# Patient Record
(demographics unavailable — no encounter records)

---

## 2024-12-23 NOTE — PHYSICAL EXAM
[FreeTextEntry1] : MS: Alert & oriented to person, place time, good fund of knowledge. Follows commands CN: VFF to confrontation, EOMI full without nystagmus, PERRL, V1-V3 intact to light touch, face symmetrical, hears finger rub bilaterally, palate elevates symmetrically, shoulders elevate symmetrically, tongue midline MOTOR: In wheelchair, normal tone x 4 extremities, Power 5/5 proximally and distally bilaterally, no drift, normal rapid alternating movements. SENSORY: Intact LT x 4 extremities REFLEXES: Biceps 2+ bilaterally, triceps 2+ bilaterally, brachioradialis 2+ bilaterally, patella 2+ bilaterally, ankle 2+ bilaterally, plantar flexor bilaterally COORD: Normal FNF, no tremor or dysmetria GAIT: Deferred

## 2024-12-23 NOTE — HISTORY OF PRESENT ILLNESS
[FreeTextEntry1] : ***12/23/2024*** Interval History: Had a mechanical fall in August 2024 and had a femoral fracture, was hospitalized and had surgery, has recovered from this at this time. She is doing well otherwise.   She has been on Gabapentin 600 mg TID, she takes it BID currently. She has been on this for 5 years, daughter who is accompanying the patient states she has not noticed any adverse effects. She has not had any seizures or seizure like events.  She also takes Clonazepam 0.25 mg qhs for sleep. She is also scheduled for an outpatient appt with a sleep specialist.  For her fibromyalgia and pain in her lower back, she takes Cyclobenzaprine 10 mg.  She is in a wheelchair during the appointment, daughter who is accompanying her states that she is able to ambulate but if she needs to go up stairs. She had PT in the past after her hospitalization, has been having PT come to the house.  ***UPDATE:8/29/2023*** Appointment was conducted by audiovisual/telehealth at request of patient in place of a follow up office visit due to heightened concern for Corona virus infection risk. Verbal consent given on Aug 29, 2023 03:26 PM by the patient Ms. LAST SHARPE May 23 1953 who understands that the telephone visit will be charged to insurance and may involve co-pay for patient Nurse Practitioner location:office Patient location:home Individuals on call: LILIA Frances, Ms. LAST SHARPE/daughter-Cherry Sharpe is doing well with no reported interval seizures Her daughter states that the Clonazepam 0.25 mg is working better for her to help sleep then the 0.5 mg tabs as the higher dose causes fatigue during the day  ***UPDATE:8/9/2023*** Appointment was conducted by audiovisual/telehealth at request of patient in place of a follow up office visit due to heightened concern for Corona virus infection risk. Verbal consent given on Aug 10, 2023 01:54 AM by the patient Ms. LAST SHARPE May 23 1953 who understands that the telephone visit will be charged to insurance and may involve co-pay for patient Nurse Practitioner location:office Patient location:home Individuals on call: LILIA Frances, Ms. LAST SHARPE/daughter Cherry Sharpe is doing well without seizures and the Savella is helping her Fibromyalgia.'Daughter states she has been having trouble sleeping and her energy levels are low She has tried Melatonin and CBD oil and also does deep breathing exercises.   ***UPDATE:5/10/2023*** Appointment was conducted by audiovisual/telehealth at request of patient in place of a follow up office visit due to heightened concern for Corona virus infection risk. Verbal consent given on May 10, 2023 1:24 PM by the patient Ms. LAST SHARPE May 23 1953 who understands that the telephone visit will be charged to insurance and may involve co-pay for patient Nurse Practitioner location:office Patient location:home Individuals on call: LILIA Frances, Ms. LAST SHARPE/daughter Cherry Sharpe is doing well with no reported interval seizures Fibromyalgia much better on Savella  Gabapentin 600mg TID Savella 50 mg twice daily   *** 02/01/2023 ***  LAST SHARPE is here for follow up. her history is as below:  Hospital Course  70 YO Female with a PMHx significant for lupus, atrial fibrillation, seizures, CVA (11/18/22 with R M1 occlusion, L sided paresis s/p TPA and thrombectomy), arthritis, HTN, HLD, fibromyalgia, spinal canal stenosis, has presented to the ED due to concern for witnessed seizure like activity at 9 AM on 12/4/22 for which neurology consulted. Per daughter, who has cameras at home, pt went to the bathroom around that time and when she came back, pt seemed somewhat confused, eyes rolled back with RUE shaking with upper body rotated to left, urinary incontinence with associated post ictal confusion but no tongue bite. Pt was not answering questions properly with incomplete sentences and had trouble remember some things like names and repeatedly said "yes". At baseline pt is AAOx3 and needs with help with activities at home. Patient admitted to EMU and monitored on EEG. Gabapentin is increased to 600mg BID and Follow up with Dr. Huber outpatient.

## 2024-12-23 NOTE — ASSESSMENT
[FreeTextEntry1] : LAST PATEL is a 71 year old with history of suspected provoked seizure. Her gabapentin was increased in the past, she currently takes Gabapentin 600 mg twice daily and she is stable on this regimen. She states her diffuse pain (fibromyalgia and spinal stenosis) has been much better controlled on Savella.  Plan: -Continue Gabapentin 600 BID/Savella 50 mg BID -Continue Clonazepam 0.25 mg #30 qhs to help with sleep -F/u in 6-9 months with Dr Steve, please call if any breakthrough seizures.

## 2025-04-07 NOTE — HISTORY OF PRESENT ILLNESS
[FreeTextEntry1] : ***12/23/2024*** Interval History: Had a mechanical fall in August 2024 and had a femoral fracture, was hospitalized and had surgery, has recovered from this at this time. She is doing well otherwise.   She has been on Gabapentin 600 mg TID, she takes it BID currently. She has been on this for 5 years, daughter who is accompanying the patient states she has not noticed any adverse effects. She has not had any seizures or seizure like events.  She also takes Clonazepam 0.25 mg qhs for sleep. She is also scheduled for an outpatient appt with a sleep specialist.  For her fibromyalgia and pain in her lower back, she takes Cyclobenzaprine 10 mg.  She is in a wheelchair during the appointment, daughter who is accompanying her states that she is able to ambulate but if she needs to go up stairs. She had PT in the past after her hospitalization, has been having PT come to the house.  ***UPDATE:8/29/2023*** Appointment was conducted by audiovisual/telehealth at request of patient in place of a follow up office visit due to heightened concern for Corona virus infection risk. Verbal consent given on Aug 29, 2023 03:26 PM by the patient Ms. LAST SHARPE May 23 1953 who understands that the telephone visit will be charged to insurance and may involve co-pay for patient Nurse Practitioner location:office Patient location:home Individuals on call: LILIA Frances, Ms. LAST SHARPE/daughter-Cherry Sharpe is doing well with no reported interval seizures Her daughter states that the Clonazepam 0.25 mg is working better for her to help sleep then the 0.5 mg tabs as the higher dose causes fatigue during the day  ***UPDATE:8/9/2023*** Appointment was conducted by audiovisual/telehealth at request of patient in place of a follow up office visit due to heightened concern for Corona virus infection risk. Verbal consent given on Aug 10, 2023 01:54 AM by the patient Ms. LAST SHARPE May 23 1953 who understands that the telephone visit will be charged to insurance and may involve co-pay for patient Nurse Practitioner location:office Patient location:home Individuals on call: LILIA Frances, Ms. LAST SHARPE/daughter Cherry Sharpe is doing well without seizures and the Savella is helping her Fibromyalgia.'Daughter states she has been having trouble sleeping and her energy levels are low She has tried Melatonin and CBD oil and also does deep breathing exercises.   ***UPDATE:5/10/2023*** Appointment was conducted by audiovisual/telehealth at request of patient in place of a follow up office visit due to heightened concern for Corona virus infection risk. Verbal consent given on May 10, 2023 1:24 PM by the patient Ms. LAST SHARPE May 23 1953 who understands that the telephone visit will be charged to insurance and may involve co-pay for patient Nurse Practitioner location:office Patient location:home Individuals on call: LILIA Frances, Ms. LAST SHARPE/daughter Cherry Sharpe is doing well with no reported interval seizures Fibromyalgia much better on Savella  Gabapentin 600mg TID Savella 50 mg twice daily   *** 02/01/2023 ***  LAST SHARPE is here for follow up. her history is as below:  Hospital Course  68 YO Female with a PMHx significant for lupus, atrial fibrillation, seizures, CVA (11/18/22 with R M1 occlusion, L sided paresis s/p TPA and thrombectomy), arthritis, HTN, HLD, fibromyalgia, spinal canal stenosis, has presented to the ED due to concern for witnessed seizure like activity at 9 AM on 12/4/22 for which neurology consulted. Per daughter, who has cameras at home, pt went to the bathroom around that time and when she came back, pt seemed somewhat confused, eyes rolled back with RUE shaking with upper body rotated to left, urinary incontinence with associated post ictal confusion but no tongue bite. Pt was not answering questions properly with incomplete sentences and had trouble remember some things like names and repeatedly said "yes". At baseline pt is AAOx3 and needs with help with activities at home. Patient admitted to EMU and monitored on EEG. Gabapentin is increased to 600mg BID and Follow up with Dr. Huber outpatient.

## 2025-07-08 NOTE — HISTORY OF PRESENT ILLNESS
[FreeTextEntry1] : LAST PATEL is a 72  year RH female with a Pmhx of Pmhx HTN, HLD, afib now s/p Watchman, CVA 11/18/2022 R M1 occlusion s/p tPA and thrombectomy, Lupus, fibromyalgia, diverticulosis s/p sigmoid colonic rupture s/p colostomy and anastomosis 2017, Seizures, chronic lower back pain onset 2012 following work related injury- [employer] transferring patient who presents today for follow up.   Since her last visit reports Left femur fracture following fall s/p ORIF sustained  s/p Aug 2024 getting PT and walking better.    Chronic lower back pain constant daily pain fluctuating in intensity B/L L>R occasionally radiating down to feet 6-7/10. Chronic pain thighs/knees down to feet 5-7/10 on average. She is ambulating with SAC/RW indoors and WC for community mobility.  She is currently taking Savella 50 mg 2x/day, Cyclobenzaprine 10 mg 1-2x/day ( Rheum), Gabapentin 600mg 2x/day, APAP.  Previously saw pain management, tried PT, trigger points, LESI no help. S/p left Synvisc injections no help.   Allergies: NKDA  Prior medications: Morphine AE itching .   Current medications :  ASA 81 mg daily , Hydroxychloroquine 200mg/day, gabapentin 600mg 2x/day(seizures and pain, Metoprolol 25mg daily, telmisartan 40mg daily, Pravastatin, cyclobenzaprine 10 mg q hs, Savella 50 mg 2x/day (Feb 2023), clonazepam 0.25 mg q hs ( sleep), gabapentin 600mg 2x/day(seizures and pain  Social Hx:  She is  and lives with her daughter in [place].  She denies illicits, etoh and illicits but taking CBD.  She is retired [employer] and stopped working in 2012, retired secondary to disability related to work related injury.

## 2025-07-08 NOTE — ASSESSMENT
[FreeTextEntry1] : 72 year RH female with a Pmhx of Pmhx HTN, HLD, afib now s/p Watchman, CVA 11/18/2022 R M1 occlusion s/p tPA and thrombectomy, Lupus, fibromyalgia, diverticulosis s/p sigmoid colonic rupture s/p colostomy and anastomosis 2017, Seizures, chronic lower back pain onset 2012 following work related injury progressively worsened over time and now impeding on functioning and QOL. She has failed multiple conservative tx including PT.   Recommend MRI L spine to determine further tx options including possible intervention/LESI  -Continue Savella 50mg 2x/day  -Continue Gabapentin 600 mg 2x/day (seizures and pain) -Cyclobenzaprine 10 mg q hs ( pain and cramps previously rx by Rheum)  -Voltaren gel topical 2-3x/day left knee , consider topical compound cream.  -Restart PT    The patient and her daughter Cherry who was present at the time of the visit had the opportunity to ask questions and all were answered to their satisfaction.  The patient verbalized understanding of the management plan and agreed with our recommendations.